# Patient Record
(demographics unavailable — no encounter records)

---

## 2024-12-09 NOTE — PHYSICAL EXAM
[Alert] : alert [Well Nourished] : well nourished [No Acute Distress] : no acute distress [Well Developed] : well developed [Normal Sclera/Conjunctiva] : normal sclera/conjunctiva [EOMI] : extra ocular movement intact [PERRL] : pupils equal, round and reactive to light [No Proptosis] : no proptosis [Normal Outer Ear/Nose] : the ears and nose were normal in appearance [Normal Oropharynx] : the oropharynx was normal [Thyroid Not Enlarged] : the thyroid was not enlarged [No Thyroid Nodules] : no palpable thyroid nodules [No Respiratory Distress] : no respiratory distress [No Accessory Muscle Use] : no accessory muscle use [Clear to Auscultation] : lungs were clear to auscultation bilaterally [Normal S1, S2] : normal S1 and S2 [Normal Rate] : heart rate was normal [Regular Rhythm] : with a regular rhythm [No Edema] : no peripheral edema [Pedal Pulses Normal] : the pedal pulses are present [Normal Bowel Sounds] : normal bowel sounds [Not Tender] : non-tender [Not Distended] : not distended [Soft] : abdomen soft [Normal Anterior Cervical Nodes] : no anterior cervical lymphadenopathy [Normal Posterior Cervical Nodes] : no posterior cervical lymphadenopathy [No Spinal Tenderness] : no spinal tenderness [Spine Straight] : spine straight [No Stigmata of Cushings Syndrome] : no stigmata of Cushings Syndrome [Normal Gait] : normal gait [Normal Strength/Tone] : muscle strength and tone were normal [No Rash] : no rash [No Skin Lesions] : no skin lesions [Acanthosis Nigricans] : no acanthosis nigricans [No Motor Deficits] : the motor exam was normal [No Sensory Deficits] : the sensory exam was normal to light touch and pinprick [Normal Reflexes] : deep tendon reflexes were 2+ and symmetric [No Tremors] : no tremors [Oriented x3] : oriented to person, place, and time [de-identified] : Patient BMI is 23.38

## 2024-12-09 NOTE — HISTORY OF PRESENT ILLNESS
[FreeTextEntry1] : 64-year-old white female who has a past medical history of primary hypothyroidism secondary to Hashimoto's disease and a very mild multinodular goiter presents for routine follow-up and evaluation.  Patient currently is taking levothyroxine 125 mcg tablets every day.  She has been complaining of slight weight gain, loss of energy, chronic fatigue and malaise for the past few months.  Patient claimed that she has not been feeling herself and has been seeking nutritional evaluation and is currently receiving IV vitamins.  She denies any significant loss of hair but she has noticed mild thinning of the hair.  There is no is history of any chest pain, palpitations, shortness of breath.  She has noticed a slight increase in her weight.  Her bowel habits are normal.  She denies any skin rash or joint pains or muscle cramps.  Patient claims to be compliant with her medication.  Physically she is active and exercises on a regular basis.

## 2024-12-09 NOTE — REVIEW OF SYSTEMS
[Fatigue] : fatigue [Decreased Appetite] : appetite not decreased [Recent Weight Gain (___ Lbs)] : recent weight gain: [unfilled] lbs [Negative] : Heme/Lymph

## 2024-12-09 NOTE — PROCEDURE
[FreeTextEntry1] : Young white female with a past medical history of primary hypothyroidism presents with the main chronic fatigue and tiredness and mild weight gain.  Patient is compliant with her medication levothyroxine 125 mcg tablets daily.  She recently had a complete blood work done on October 22 of this year which showed that the TSH level is 1.20 and the free T4 index is 2.8 and a total T4 7.5.  Patient is clinically euthyroid.  Patient is requesting a trial with the brand form of the levothyroxine which she claims was much different and she had felt significant improvement in her symptoms when she took it in the past.  Recommendation 1 I will change the patient's medication to the brand name of Synthroid 125 mcg tablets daily 2.  Patient will have a repeat blood test in approximately 3 months time and if stable she will then return to the office for follow-up evaluation in 1 year. 3.  The importance of diet exercise and weight control was discussed with the patient.